# Patient Record
Sex: FEMALE | Race: BLACK OR AFRICAN AMERICAN
[De-identification: names, ages, dates, MRNs, and addresses within clinical notes are randomized per-mention and may not be internally consistent; named-entity substitution may affect disease eponyms.]

---

## 2018-05-15 NOTE — RAD
PA AND LATERAL CHEST:

 

Indication: Sore throat. 

 

Comparison: 9-20-14

 

FINDINGS: 

Lungs are clear. Cardiomediastinal silhouette is within normal limits. No acute osseous abnormality i
s evident. 

 

IMPRESSION: 

No acute cardiopulmonary abnormality. 

 

POS: H

## 2020-01-14 ENCOUNTER — HOSPITAL ENCOUNTER (OUTPATIENT)
Dept: HOSPITAL 92 - SLEEPLAB | Age: 30
Discharge: HOME | End: 2020-01-14
Attending: INTERNAL MEDICINE
Payer: COMMERCIAL

## 2020-01-14 DIAGNOSIS — G47.419: Primary | ICD-10-CM

## 2020-01-14 DIAGNOSIS — R53.83: ICD-10-CM

## 2020-01-14 PROCEDURE — 95810 POLYSOM 6/> YRS 4/> PARAM: CPT

## 2022-01-14 ENCOUNTER — HOSPITAL ENCOUNTER (OUTPATIENT)
Dept: HOSPITAL 92 - CSHULT | Age: 32
Discharge: HOME | End: 2022-01-14
Attending: FAMILY MEDICINE
Payer: MEDICAID

## 2022-01-14 DIAGNOSIS — R93.89: ICD-10-CM

## 2022-01-14 DIAGNOSIS — N93.0: Primary | ICD-10-CM

## 2022-01-14 DIAGNOSIS — E28.2: ICD-10-CM

## 2022-01-14 DIAGNOSIS — N83.8: ICD-10-CM

## 2022-01-14 PROCEDURE — 76856 US EXAM PELVIC COMPLETE: CPT

## 2023-10-05 ENCOUNTER — HOSPITAL ENCOUNTER (OUTPATIENT)
Dept: HOSPITAL 92 - CSHULT | Age: 33
Discharge: HOME | End: 2023-10-05
Attending: STUDENT IN AN ORGANIZED HEALTH CARE EDUCATION/TRAINING PROGRAM
Payer: COMMERCIAL

## 2023-10-05 DIAGNOSIS — R74.01: Primary | ICD-10-CM

## 2023-10-05 DIAGNOSIS — K76.9: ICD-10-CM

## 2023-10-05 PROCEDURE — 76705 ECHO EXAM OF ABDOMEN: CPT
